# Patient Record
Sex: MALE | Race: ASIAN | NOT HISPANIC OR LATINO | Employment: UNEMPLOYED | URBAN - METROPOLITAN AREA
[De-identification: names, ages, dates, MRNs, and addresses within clinical notes are randomized per-mention and may not be internally consistent; named-entity substitution may affect disease eponyms.]

---

## 2020-05-19 ENCOUNTER — HOSPITAL ENCOUNTER (EMERGENCY)
Facility: HOSPITAL | Age: 50
Discharge: PSYCHIATRIC OR SUBSTANCE USE FACILITY | End: 2020-05-20
Attending: EMERGENCY MEDICINE
Payer: MEDICARE

## 2020-05-19 DIAGNOSIS — R56.9 SEIZURE: ICD-10-CM

## 2020-05-19 DIAGNOSIS — F29 PSYCHOSIS, UNSPECIFIED PSYCHOSIS TYPE: Primary | ICD-10-CM

## 2020-05-19 LAB
ALBUMIN SERPL BCP-MCNC: 4.3 G/DL (ref 3.5–5.2)
ALP SERPL-CCNC: 66 U/L (ref 55–135)
ALT SERPL W/O P-5'-P-CCNC: 14 U/L (ref 10–44)
AMPHET+METHAMPHET UR QL: NEGATIVE
AMPHET+METHAMPHET UR QL: NEGATIVE
ANION GAP SERPL CALC-SCNC: 13 MMOL/L (ref 8–16)
APAP SERPL-MCNC: <3 UG/ML (ref 10–20)
AST SERPL-CCNC: 24 U/L (ref 10–40)
BARBITURATES UR QL SCN>200 NG/ML: NEGATIVE
BARBITURATES UR QL SCN>200 NG/ML: NEGATIVE
BASOPHILS # BLD AUTO: 0.02 K/UL (ref 0–0.2)
BASOPHILS NFR BLD: 0.2 % (ref 0–1.9)
BENZODIAZ UR QL SCN>200 NG/ML: NEGATIVE
BENZODIAZ UR QL SCN>200 NG/ML: NEGATIVE
BILIRUB SERPL-MCNC: 0.5 MG/DL (ref 0.1–1)
BILIRUB UR QL STRIP: NEGATIVE
BUN SERPL-MCNC: 9 MG/DL (ref 6–20)
BZE UR QL SCN: NEGATIVE
BZE UR QL SCN: NEGATIVE
CALCIUM SERPL-MCNC: 10 MG/DL (ref 8.7–10.5)
CANNABINOIDS UR QL SCN: NEGATIVE
CANNABINOIDS UR QL SCN: NEGATIVE
CHLORIDE SERPL-SCNC: 98 MMOL/L (ref 95–110)
CK SERPL-CCNC: 466 U/L (ref 20–200)
CLARITY UR: CLEAR
CO2 SERPL-SCNC: 24 MMOL/L (ref 23–29)
COLOR UR: ABNORMAL
CREAT SERPL-MCNC: 0.8 MG/DL (ref 0.5–1.4)
CREAT UR-MCNC: 13.3 MG/DL (ref 23–375)
CREAT UR-MCNC: 13.5 MG/DL (ref 23–375)
DIFFERENTIAL METHOD: ABNORMAL
EOSINOPHIL # BLD AUTO: 0 K/UL (ref 0–0.5)
EOSINOPHIL NFR BLD: 0.2 % (ref 0–8)
ERYTHROCYTE [DISTWIDTH] IN BLOOD BY AUTOMATED COUNT: 14.5 % (ref 11.5–14.5)
EST. GFR  (AFRICAN AMERICAN): >60 ML/MIN/1.73 M^2
EST. GFR  (NON AFRICAN AMERICAN): >60 ML/MIN/1.73 M^2
ETHANOL SERPL-MCNC: <10 MG/DL
GLUCOSE SERPL-MCNC: 105 MG/DL (ref 70–110)
GLUCOSE UR QL STRIP: NEGATIVE
HCT VFR BLD AUTO: 36.7 % (ref 40–54)
HGB BLD-MCNC: 12.3 G/DL (ref 14–18)
HGB UR QL STRIP: NEGATIVE
IMM GRANULOCYTES # BLD AUTO: 0.04 K/UL (ref 0–0.04)
IMM GRANULOCYTES NFR BLD AUTO: 0.4 % (ref 0–0.5)
KETONES UR QL STRIP: NEGATIVE
LEUKOCYTE ESTERASE UR QL STRIP: NEGATIVE
LYMPHOCYTES # BLD AUTO: 1.7 K/UL (ref 1–4.8)
LYMPHOCYTES NFR BLD: 18.7 % (ref 18–48)
MCH RBC QN AUTO: 30.5 PG (ref 27–31)
MCHC RBC AUTO-ENTMCNC: 33.5 G/DL (ref 32–36)
MCV RBC AUTO: 91 FL (ref 82–98)
METHADONE UR QL SCN>300 NG/ML: NEGATIVE
METHADONE UR QL SCN>300 NG/ML: NEGATIVE
MONOCYTES # BLD AUTO: 1.2 K/UL (ref 0.3–1)
MONOCYTES NFR BLD: 13.9 % (ref 4–15)
NEUTROPHILS # BLD AUTO: 5.9 K/UL (ref 1.8–7.7)
NEUTROPHILS NFR BLD: 66.6 % (ref 38–73)
NITRITE UR QL STRIP: NEGATIVE
NRBC BLD-RTO: 0 /100 WBC
OPIATES UR QL SCN: NEGATIVE
OPIATES UR QL SCN: NEGATIVE
PCP UR QL SCN>25 NG/ML: NEGATIVE
PCP UR QL SCN>25 NG/ML: NEGATIVE
PH UR STRIP: 8 [PH] (ref 5–8)
PLATELET # BLD AUTO: 310 K/UL (ref 150–350)
PMV BLD AUTO: 9.1 FL (ref 9.2–12.9)
POTASSIUM SERPL-SCNC: 3.8 MMOL/L (ref 3.5–5.1)
PROT SERPL-MCNC: 8.1 G/DL (ref 6–8.4)
PROT UR QL STRIP: NEGATIVE
RBC # BLD AUTO: 4.03 M/UL (ref 4.6–6.2)
SARS-COV-2 RDRP RESP QL NAA+PROBE: NEGATIVE
SODIUM SERPL-SCNC: 135 MMOL/L (ref 136–145)
SP GR UR STRIP: 1 (ref 1–1.03)
TOXICOLOGY INFORMATION: ABNORMAL
TOXICOLOGY INFORMATION: ABNORMAL
TROPONIN I SERPL DL<=0.01 NG/ML-MCNC: <0.006 NG/ML (ref 0–0.03)
TSH SERPL DL<=0.005 MIU/L-ACNC: 0.62 UIU/ML (ref 0.4–4)
URN SPEC COLLECT METH UR: ABNORMAL
UROBILINOGEN UR STRIP-ACNC: NEGATIVE EU/DL
WBC # BLD AUTO: 8.91 K/UL (ref 3.9–12.7)

## 2020-05-19 PROCEDURE — 93010 ELECTROCARDIOGRAM REPORT: CPT | Mod: ,,, | Performed by: INTERNAL MEDICINE

## 2020-05-19 PROCEDURE — 81003 URINALYSIS AUTO W/O SCOPE: CPT | Mod: 59

## 2020-05-19 PROCEDURE — 25000003 PHARM REV CODE 250: Performed by: EMERGENCY MEDICINE

## 2020-05-19 PROCEDURE — 80320 DRUG SCREEN QUANTALCOHOLS: CPT | Mod: 59

## 2020-05-19 PROCEDURE — 80307 DRUG TEST PRSMV CHEM ANLYZR: CPT

## 2020-05-19 PROCEDURE — 93010 EKG 12-LEAD: ICD-10-PCS | Mod: ,,, | Performed by: INTERNAL MEDICINE

## 2020-05-19 PROCEDURE — 63600175 PHARM REV CODE 636 W HCPCS: Performed by: EMERGENCY MEDICINE

## 2020-05-19 PROCEDURE — 96366 THER/PROPH/DIAG IV INF ADDON: CPT

## 2020-05-19 PROCEDURE — 84443 ASSAY THYROID STIM HORMONE: CPT

## 2020-05-19 PROCEDURE — 84484 ASSAY OF TROPONIN QUANT: CPT

## 2020-05-19 PROCEDURE — 99285 EMERGENCY DEPT VISIT HI MDM: CPT | Mod: 25

## 2020-05-19 PROCEDURE — 82550 ASSAY OF CK (CPK): CPT

## 2020-05-19 PROCEDURE — 80053 COMPREHEN METABOLIC PANEL: CPT

## 2020-05-19 PROCEDURE — 85025 COMPLETE CBC W/AUTO DIFF WBC: CPT

## 2020-05-19 PROCEDURE — 96365 THER/PROPH/DIAG IV INF INIT: CPT

## 2020-05-19 PROCEDURE — 93005 ELECTROCARDIOGRAM TRACING: CPT

## 2020-05-19 PROCEDURE — U0002 COVID-19 LAB TEST NON-CDC: HCPCS

## 2020-05-19 PROCEDURE — 80329 ANALGESICS NON-OPIOID 1 OR 2: CPT

## 2020-05-19 RX ADMIN — THIAMINE HYDROCHLORIDE 100 MG: 100 INJECTION, SOLUTION INTRAMUSCULAR; INTRAVENOUS at 03:05

## 2020-05-19 RX ADMIN — SODIUM CHLORIDE, SODIUM LACTATE, POTASSIUM CHLORIDE, AND CALCIUM CHLORIDE 1000 ML: .6; .31; .03; .02 INJECTION, SOLUTION INTRAVENOUS at 03:05

## 2020-05-19 NOTE — ED TRIAGE NOTES
The patient presents to the ED via ems. Emt states that patient was found at [employer] by workers with ataxic movements. Patient seems to be awake but post ictal upon arrival. Patient is oriented to self but shakes head to answer questions. Hx of brain injury,stroke. Patient also c/o N/V/D x 1 day.

## 2020-05-19 NOTE — ED PROVIDER NOTES
"Encounter Date: 5/19/2020    SCRIBE #1 NOTE: I, Louie Badillo, am scribing for, and in the presence of,  Aki Morillo MD. I have scribed the following portions of the note - Other sections scribed: HPI, ROS, PE.       History     Chief Complaint   Patient presents with    Seizures     witnessed sz in [employer].  Hx brain injury.  took oxy for back pain this am.  pt not talking for following commands.     CC: Seizures    HPI: This is a 50 y.o. M who Hx of Stroke, Depression, and Brain surgery who presents to the ED for emergent evaluation of possible seizure PTA. Pt was found with "seizure-like activity" by workers in [employer] PTA. Pt currently complains of headache. He also admits to cough for the past few days. When asked if had a seizure today pt states "a little bit." He also states that he has seizures sometimes. He is not on seizure medications. He states that he eats 2 meals per day and drinks beer sometimes. He denies drinking beer today. He has been drinking 3 beers per day for a long time. He last consumed beer 1 week ago. He states that he shakes sometimes when he doesn't drink beer, but he has never had a seizure if doesn't drink beer. He states that he was last seen by his PCP 2-3 months ago.    The history is provided by the patient. The history is limited by a language barrier. A  was used (Language line used for interpretation).     Review of patient's allergies indicates:  No Known Allergies  Past Medical History:   Diagnosis Date    Depression     Hemorrhage in the brain     Seizures     Stroke      Past Surgical History:   Procedure Laterality Date    BRAIN SURGERY      KNEE SURGERY       Family History   Problem Relation Age of Onset    Hypertension Mother     Stroke Mother     Stroke Father      Social History     Tobacco Use    Smoking status: Current Some Day Smoker   Substance Use Topics    Alcohol use: Yes     Comment: rarely    Drug use: No "     Review of Systems   Constitutional: Negative for chills and fever.   HENT: Negative for sore throat.    Eyes: Negative for visual disturbance.   Respiratory: Positive for cough. Negative for shortness of breath.    Cardiovascular: Negative for chest pain.   Gastrointestinal: Negative for abdominal pain, diarrhea, nausea and vomiting.   Genitourinary: Negative for dysuria.   Musculoskeletal: Negative for back pain.   Skin: Negative for rash.   Neurological: Positive for seizures and headaches. Negative for weakness.   Hematological: Does not bruise/bleed easily.       Physical Exam     Initial Vitals [05/19/20 1422]   BP Pulse Resp Temp SpO2   135/78 95 16 99.4 °F (37.4 °C) 98 %      MAP       --         Physical Exam    Nursing note and vitals reviewed.  Constitutional: He appears well-developed and well-nourished. He is not diaphoretic. No distress.   HENT:   Head: Normocephalic and atraumatic.   Right Ear: External ear normal.   Left Ear: External ear normal.   Nose: Nose normal.   Eyes: Conjunctivae and EOM are normal. Pupils are equal, round, and reactive to light.   Neck: Normal range of motion. Neck supple.   Cardiovascular: Normal rate, regular rhythm, normal heart sounds and intact distal pulses.   Pulmonary/Chest: Breath sounds normal. No stridor. No respiratory distress.   Abdominal: Soft. Bowel sounds are normal. There is no tenderness.   Musculoskeletal: He exhibits no edema or tenderness.   Neurological: He is alert.   Skin: Skin is warm and dry. No pallor.         ED Course   Procedures  Labs Reviewed   CBC W/ AUTO DIFFERENTIAL - Abnormal; Notable for the following components:       Result Value    RBC 4.03 (*)     Hemoglobin 12.3 (*)     Hematocrit 36.7 (*)     MPV 9.1 (*)     Mono # 1.2 (*)     All other components within normal limits   COMPREHENSIVE METABOLIC PANEL - Abnormal; Notable for the following components:    Sodium 135 (*)     All other components within normal limits   DRUG SCREEN  PANEL, URINE EMERGENCY - Abnormal; Notable for the following components:    Creatinine, Random Ur 13.5 (*)     All other components within normal limits   CK - Abnormal; Notable for the following components:     (*)     All other components within normal limits   ACETAMINOPHEN LEVEL - Abnormal; Notable for the following components:    Acetaminophen (Tylenol), Serum <3.0 (*)     All other components within normal limits   DRUG SCREEN PANEL, URINE EMERGENCY - Abnormal; Notable for the following components:    Creatinine, Random Ur 13.3 (*)     All other components within normal limits    Narrative:     Preferred Collection Type->Urine, Clean Catch   URINALYSIS, REFLEX TO URINE CULTURE - Abnormal; Notable for the following components:    Specific Gravity, UA 1.000 (*)     All other components within normal limits    Narrative:     Preferred Collection Type->Urine, Clean Catch   ALCOHOL,MEDICAL (ETHANOL)   TROPONIN I   TSH   SARS-COV-2 RNA AMPLIFICATION, QUAL   URINALYSIS, REFLEX TO URINE CULTURE          Imaging Results          X-Ray Chest AP Portable (Final result)  Result time 05/19/20 16:51:33    Final result by Clarke Dos Santos MD (05/19/20 16:51:33)                 Impression:      Minimal bibasilar airspace disease could represent minimal infiltrate or atelectasis.      Electronically signed by: Clarke Dos Santos  Date:    05/19/2020  Time:    16:51             Narrative:    EXAMINATION:  XR CHEST AP PORTABLE    CLINICAL HISTORY:  Unspecified convulsions    TECHNIQUE:  Single frontal view of the chest was performed.    COMPARISON:  None    FINDINGS:  Minimal bibasilar airspace disease could represent minimal infiltrate or atelectasis.    No mass or lobar consolidation.    The heart is normal size.  No edema is detected.  No effusion or pneumothorax.  No acute osseous abnormality.                               CT Head Without Contrast (Final result)  Result time 05/19/20 15:45:54    Final result by Otoniel XIAO  MD Antonette (05/19/20 15:45:54)                 Impression:      No acute intracranial abnormalities identified.  No evidence for intracranial hemorrhage.    Surgical changes related to prior left parietal craniotomy.    Ethmoid sinus mucosal disease.      Electronically signed by: Otoniel Whitman MD  Date:    05/19/2020  Time:    15:45             Narrative:    EXAMINATION:  CT HEAD WITHOUT CONTRAST    CLINICAL HISTORY:  Seizures new or progressive;    TECHNIQUE:  Low dose axial images were obtained through the head.  Coronal and sagittal reformations were also performed. Contrast was not administered.    COMPARISON:  None.    FINDINGS:  The ventricles are normal in size and configuration.  There is normal gray-white matter differentiation maintained.  There is no evidence for abnormal masses, mass effect, or midline shift.  No abnormal intra or extra-axial fluid collections are identified, specifically there is no evidence for intracranial hemorrhage.  There is mucosal thickening within the ethmoid sinuses.  Mastoid air cells are clear.  There are surgical changes related to prior left parietal craniotomy.                                 Medical Decision Making:   Clinical Tests:   Lab Tests: Ordered and Reviewed  Radiological Study: Ordered and Reviewed  Medical Tests: Ordered and Reviewed  ED Management:  50 year old M presents to the ED for emergent evaluation of seizure. The patient was seen and examined. The history and physical exam was obtained. The nursing notes and vital signs were reviewed.             ED workup remarkable for CBC/CMP wnl, CPK - 466, trop wnl, acetaminophen/salicylate neg, ETOH neg, CT head negative, CXR minimal bibasilar airspace disease.  Pt presentation concerning initially for seizures, hwoever upon further evaluation patient reporting tremors with increased drinking, last drink was 1 week ago.  Timeline and presentation inconsistent with seizures or with DTs/ ETOH withdrawal.  Started  "on IVFs/thiamine.  After further discussion with patient and daughter, Jeanine Frances, reporting pateint has been having worsening hallucinations and was running around his apt complex holding a machete and threatening "spirits".  Pt reports the spirits are getting worse and family is worried he is going to harm himself or someone around him and were going to bring him to be "committed" earlier today when he had wandered off and was found at [employer] as noted above.  PEC placed and patient pending labwork and full medical clearance, turnover to Dr. Love pending medical clearance for psychiatric hospitalization.    Patient signed out to me around 6:10 p.m. pending urinalysis and urine drug screen.  Please see above note for in details.  Patient is not showing any tremors at this time is not tachycardic or markedly hypertensive.  Patient is sleeping and resting on multiple exams.  Does not look in acute distress.  Urinalysis was we sent off.  UA came back is negative an UDS is negative.  Patient is medically cleared for inpatient psych.  Elder Love                                 Clinical Impression:       ICD-10-CM ICD-9-CM   1. Psychosis, unspecified psychosis type F29 298.9   2. Seizure R56.9 780.39             ED Disposition Condition    Transfer to Psych Facility         ED Prescriptions     None        Follow-up Information    None                       I, Elder Love M.D., personally performed the services described in this documentation. All medical record entries made by the scribe were at my direction and in my presence. I have reviewed the chart and agree that the record reflects my personal performance and is accurate and complete.             Elder Love MD  05/19/20 2053    "

## 2020-05-20 VITALS
TEMPERATURE: 99 F | HEART RATE: 79 BPM | SYSTOLIC BLOOD PRESSURE: 105 MMHG | RESPIRATION RATE: 17 BRPM | DIASTOLIC BLOOD PRESSURE: 70 MMHG | OXYGEN SATURATION: 100 %

## 2020-05-20 NOTE — ED NOTES
notified [facility] office of [facility]

## 2020-05-20 NOTE — ED NOTES
Pt awake and able to stand on the side of the bed to urinate in urinal; pt then assisted back into bed; pt calm and cooperative; pt denies any complaints at this time and is aware of PEC status; ED safety sitter at bedside; will continue to monitor

## 2020-05-20 NOTE — ED NOTES
Received report from MARQUISE Mixon, pt sleeping, in no acute distress, call light in reach, bed in lowest position and will continue to monitor.

## 2020-06-30 ENCOUNTER — HOSPITAL ENCOUNTER (EMERGENCY)
Facility: HOSPITAL | Age: 50
Discharge: PSYCHIATRIC OR SUBSTANCE USE FACILITY | End: 2020-07-01
Attending: EMERGENCY MEDICINE
Payer: MEDICARE

## 2020-06-30 DIAGNOSIS — R44.3 HALLUCINATIONS: Primary | ICD-10-CM

## 2020-06-30 LAB
ALBUMIN SERPL BCP-MCNC: 4 G/DL (ref 3.5–5.2)
ALP SERPL-CCNC: 53 U/L (ref 55–135)
ALT SERPL W/O P-5'-P-CCNC: 14 U/L (ref 10–44)
AMPHET+METHAMPHET UR QL: NEGATIVE
ANION GAP SERPL CALC-SCNC: 13 MMOL/L (ref 8–16)
APAP SERPL-MCNC: <3 UG/ML (ref 10–20)
AST SERPL-CCNC: 15 U/L (ref 10–40)
BARBITURATES UR QL SCN>200 NG/ML: NEGATIVE
BASOPHILS # BLD AUTO: 0.03 K/UL (ref 0–0.2)
BASOPHILS NFR BLD: 0.4 % (ref 0–1.9)
BENZODIAZ UR QL SCN>200 NG/ML: NEGATIVE
BILIRUB SERPL-MCNC: 0.4 MG/DL (ref 0.1–1)
BILIRUB UR QL STRIP: NEGATIVE
BUN SERPL-MCNC: 4 MG/DL (ref 6–20)
BZE UR QL SCN: NEGATIVE
CALCIUM SERPL-MCNC: 9.2 MG/DL (ref 8.7–10.5)
CANNABINOIDS UR QL SCN: NEGATIVE
CHLORIDE SERPL-SCNC: 107 MMOL/L (ref 95–110)
CLARITY UR: CLEAR
CO2 SERPL-SCNC: 22 MMOL/L (ref 23–29)
COLOR UR: YELLOW
CREAT SERPL-MCNC: 0.7 MG/DL (ref 0.5–1.4)
CREAT UR-MCNC: 19.8 MG/DL (ref 23–375)
DIFFERENTIAL METHOD: ABNORMAL
EOSINOPHIL # BLD AUTO: 0 K/UL (ref 0–0.5)
EOSINOPHIL NFR BLD: 0.6 % (ref 0–8)
ERYTHROCYTE [DISTWIDTH] IN BLOOD BY AUTOMATED COUNT: 15 % (ref 11.5–14.5)
EST. GFR  (AFRICAN AMERICAN): >60 ML/MIN/1.73 M^2
EST. GFR  (NON AFRICAN AMERICAN): >60 ML/MIN/1.73 M^2
ETHANOL SERPL-MCNC: <10 MG/DL
GLUCOSE SERPL-MCNC: 100 MG/DL (ref 70–110)
GLUCOSE UR QL STRIP: NEGATIVE
HCT VFR BLD AUTO: 36.2 % (ref 40–54)
HGB BLD-MCNC: 11.9 G/DL (ref 14–18)
HGB UR QL STRIP: NEGATIVE
IMM GRANULOCYTES # BLD AUTO: 0.02 K/UL (ref 0–0.04)
IMM GRANULOCYTES NFR BLD AUTO: 0.3 % (ref 0–0.5)
KETONES UR QL STRIP: NEGATIVE
LEUKOCYTE ESTERASE UR QL STRIP: NEGATIVE
LYMPHOCYTES # BLD AUTO: 1.3 K/UL (ref 1–4.8)
LYMPHOCYTES NFR BLD: 18 % (ref 18–48)
MCH RBC QN AUTO: 30.3 PG (ref 27–31)
MCHC RBC AUTO-ENTMCNC: 32.9 G/DL (ref 32–36)
MCV RBC AUTO: 92 FL (ref 82–98)
METHADONE UR QL SCN>300 NG/ML: NEGATIVE
MONOCYTES # BLD AUTO: 0.6 K/UL (ref 0.3–1)
MONOCYTES NFR BLD: 8.3 % (ref 4–15)
NEUTROPHILS # BLD AUTO: 5.2 K/UL (ref 1.8–7.7)
NEUTROPHILS NFR BLD: 72.4 % (ref 38–73)
NITRITE UR QL STRIP: NEGATIVE
NRBC BLD-RTO: 0 /100 WBC
OPIATES UR QL SCN: NEGATIVE
PCP UR QL SCN>25 NG/ML: NEGATIVE
PH UR STRIP: 7 [PH] (ref 5–8)
PLATELET # BLD AUTO: 286 K/UL (ref 150–350)
PMV BLD AUTO: 8.5 FL (ref 9.2–12.9)
POTASSIUM SERPL-SCNC: 4 MMOL/L (ref 3.5–5.1)
PROT SERPL-MCNC: 7.1 G/DL (ref 6–8.4)
PROT UR QL STRIP: NEGATIVE
RBC # BLD AUTO: 3.93 M/UL (ref 4.6–6.2)
SARS-COV-2 RDRP RESP QL NAA+PROBE: NEGATIVE
SODIUM SERPL-SCNC: 142 MMOL/L (ref 136–145)
SP GR UR STRIP: <=1.005 (ref 1–1.03)
TOXICOLOGY INFORMATION: ABNORMAL
TSH SERPL DL<=0.005 MIU/L-ACNC: 0.56 UIU/ML (ref 0.4–4)
URN SPEC COLLECT METH UR: ABNORMAL
UROBILINOGEN UR STRIP-ACNC: NEGATIVE EU/DL
WBC # BLD AUTO: 7.15 K/UL (ref 3.9–12.7)

## 2020-06-30 PROCEDURE — 84443 ASSAY THYROID STIM HORMONE: CPT

## 2020-06-30 PROCEDURE — 80320 DRUG SCREEN QUANTALCOHOLS: CPT

## 2020-06-30 PROCEDURE — 80307 DRUG TEST PRSMV CHEM ANLYZR: CPT

## 2020-06-30 PROCEDURE — 81003 URINALYSIS AUTO W/O SCOPE: CPT | Mod: 59

## 2020-06-30 PROCEDURE — 80053 COMPREHEN METABOLIC PANEL: CPT

## 2020-06-30 PROCEDURE — U0002 COVID-19 LAB TEST NON-CDC: HCPCS

## 2020-06-30 PROCEDURE — 85025 COMPLETE CBC W/AUTO DIFF WBC: CPT

## 2020-06-30 PROCEDURE — 80329 ANALGESICS NON-OPIOID 1 OR 2: CPT

## 2020-06-30 PROCEDURE — 99285 EMERGENCY DEPT VISIT HI MDM: CPT

## 2020-06-30 PROCEDURE — 36415 COLL VENOUS BLD VENIPUNCTURE: CPT

## 2020-07-01 VITALS
HEIGHT: 64 IN | SYSTOLIC BLOOD PRESSURE: 152 MMHG | HEART RATE: 93 BPM | WEIGHT: 132 LBS | OXYGEN SATURATION: 100 % | RESPIRATION RATE: 16 BRPM | DIASTOLIC BLOOD PRESSURE: 89 MMHG | BODY MASS INDEX: 22.53 KG/M2 | TEMPERATURE: 99 F
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
## 2021-02-01 ENCOUNTER — HOSPITAL ENCOUNTER (INPATIENT)
Facility: HOSPITAL | Age: 51
LOS: 2 days | Discharge: HOME | DRG: 083 | End: 2021-02-03
Attending: EMERGENCY MEDICINE | Admitting: PSYCHIATRY & NEUROLOGY
Payer: MEDICARE

## 2021-02-01 DIAGNOSIS — I62.00 SUBDURAL HEMORRHAGE: ICD-10-CM

## 2021-02-01 DIAGNOSIS — S06.329A INTRAPARENCHYMAL HEMATOMA OF BRAIN, LEFT, WITH LOSS OF CONSCIOUSNESS, INITIAL ENCOUNTER: ICD-10-CM

## 2021-02-01 DIAGNOSIS — S06.349A: Primary | ICD-10-CM

## 2021-02-01 DIAGNOSIS — R56.9 SEIZURE: ICD-10-CM

## 2021-02-01 DIAGNOSIS — F10.10 ALCOHOL ABUSE: ICD-10-CM

## 2021-02-01 DIAGNOSIS — S06.5XAA SDH (SUBDURAL HEMATOMA): ICD-10-CM

## 2021-02-01 DIAGNOSIS — F32.0 CURRENT MILD EPISODE OF MAJOR DEPRESSIVE DISORDER WITHOUT PRIOR EPISODE: ICD-10-CM

## 2021-02-01 DIAGNOSIS — I25.10 CAD (CORONARY ARTERY DISEASE): ICD-10-CM

## 2021-02-01 DIAGNOSIS — I10 ESSENTIAL HYPERTENSION: ICD-10-CM

## 2021-02-01 DIAGNOSIS — R55 SYNCOPE: ICD-10-CM

## 2021-02-01 PROBLEM — Z72.0 TOBACCO CONSUMPTION: Status: ACTIVE | Noted: 2021-02-01

## 2021-02-01 PROBLEM — I63.429 STROKE DUE TO EMBOLISM OF ANTERIOR CEREBRAL ARTERY: Status: ACTIVE | Noted: 2021-02-01

## 2021-02-01 PROBLEM — F32.A DEPRESSION: Status: ACTIVE | Noted: 2021-02-01

## 2021-02-01 LAB
ABO + RH BLD: NORMAL
ALBUMIN SERPL BCP-MCNC: 3.9 G/DL (ref 3.5–5.2)
ALP SERPL-CCNC: 102 U/L (ref 55–135)
ALT SERPL W/O P-5'-P-CCNC: 38 U/L (ref 10–44)
AMPHET+METHAMPHET UR QL: NEGATIVE
AMPHET+METHAMPHET UR QL: NEGATIVE
ANION GAP SERPL CALC-SCNC: 12 MMOL/L (ref 8–16)
APTT BLDCRRT: 27.8 SEC (ref 21–32)
AST SERPL-CCNC: 68 U/L (ref 10–40)
BARBITURATES UR QL SCN>200 NG/ML: NEGATIVE
BARBITURATES UR QL SCN>200 NG/ML: NEGATIVE
BASOPHILS # BLD AUTO: 0.04 K/UL (ref 0–0.2)
BASOPHILS NFR BLD: 0.6 % (ref 0–1.9)
BENZODIAZ UR QL SCN>200 NG/ML: NEGATIVE
BENZODIAZ UR QL SCN>200 NG/ML: NEGATIVE
BILIRUB SERPL-MCNC: 0.6 MG/DL (ref 0.1–1)
BILIRUB UR QL STRIP: NEGATIVE
BLD GP AB SCN CELLS X3 SERPL QL: NORMAL
BNP SERPL-MCNC: <10 PG/ML (ref 0–99)
BUN SERPL-MCNC: 4 MG/DL (ref 6–20)
BZE UR QL SCN: NEGATIVE
BZE UR QL SCN: NEGATIVE
CALCIUM SERPL-MCNC: 8.8 MG/DL (ref 8.7–10.5)
CANNABINOIDS UR QL SCN: NEGATIVE
CANNABINOIDS UR QL SCN: NEGATIVE
CHLORIDE SERPL-SCNC: 102 MMOL/L (ref 95–110)
CHOLEST SERPL-MCNC: 181 MG/DL (ref 120–199)
CHOLEST/HDLC SERPL: 1.3 {RATIO} (ref 2–5)
CLARITY UR: CLEAR
CO2 SERPL-SCNC: 22 MMOL/L (ref 23–29)
COLOR UR: YELLOW
CREAT SERPL-MCNC: 0.7 MG/DL (ref 0.5–1.4)
CREAT UR-MCNC: 41.4 MG/DL (ref 23–375)
CREAT UR-MCNC: 41.4 MG/DL (ref 23–375)
CTP QC/QA: YES
DIFFERENTIAL METHOD: ABNORMAL
EOSINOPHIL # BLD AUTO: 0 K/UL (ref 0–0.5)
EOSINOPHIL NFR BLD: 0 % (ref 0–8)
ERYTHROCYTE [DISTWIDTH] IN BLOOD BY AUTOMATED COUNT: 18.1 % (ref 11.5–14.5)
EST. GFR  (AFRICAN AMERICAN): >60 ML/MIN/1.73 M^2
EST. GFR  (NON AFRICAN AMERICAN): >60 ML/MIN/1.73 M^2
ESTIMATED AVG GLUCOSE: 111 MG/DL (ref 68–131)
ETHANOL SERPL-MCNC: <10 MG/DL
GLUCOSE SERPL-MCNC: 152 MG/DL (ref 70–110)
GLUCOSE UR QL STRIP: NEGATIVE
HBA1C MFR BLD: 5.5 % (ref 4–5.6)
HCT VFR BLD AUTO: 37.4 % (ref 40–54)
HDLC SERPL-MCNC: 138 MG/DL (ref 40–75)
HDLC SERPL: 76.2 % (ref 20–50)
HGB BLD-MCNC: 13 G/DL (ref 14–18)
HGB UR QL STRIP: NEGATIVE
IMM GRANULOCYTES # BLD AUTO: 0.02 K/UL (ref 0–0.04)
IMM GRANULOCYTES NFR BLD AUTO: 0.3 % (ref 0–0.5)
INR PPP: 0.9 (ref 0.8–1.2)
KETONES UR QL STRIP: ABNORMAL
LACTATE SERPL-SCNC: 2.6 MMOL/L (ref 0.5–2.2)
LDLC SERPL CALC-MCNC: 34 MG/DL (ref 63–159)
LEUKOCYTE ESTERASE UR QL STRIP: NEGATIVE
LYMPHOCYTES # BLD AUTO: 0.9 K/UL (ref 1–4.8)
LYMPHOCYTES NFR BLD: 12.4 % (ref 18–48)
MAGNESIUM SERPL-MCNC: 1.7 MG/DL (ref 1.6–2.6)
MCH RBC QN AUTO: 29.7 PG (ref 27–31)
MCHC RBC AUTO-ENTMCNC: 34.8 G/DL (ref 32–36)
MCV RBC AUTO: 85 FL (ref 82–98)
METHADONE UR QL SCN>300 NG/ML: NEGATIVE
METHADONE UR QL SCN>300 NG/ML: NEGATIVE
MONOCYTES # BLD AUTO: 0.6 K/UL (ref 0.3–1)
MONOCYTES NFR BLD: 7.9 % (ref 4–15)
NEUTROPHILS # BLD AUTO: 5.5 K/UL (ref 1.8–7.7)
NEUTROPHILS NFR BLD: 78.8 % (ref 38–73)
NITRITE UR QL STRIP: NEGATIVE
NONHDLC SERPL-MCNC: 43 MG/DL
NRBC BLD-RTO: 0 /100 WBC
OPIATES UR QL SCN: NEGATIVE
OPIATES UR QL SCN: NEGATIVE
PCP UR QL SCN>25 NG/ML: NEGATIVE
PCP UR QL SCN>25 NG/ML: NEGATIVE
PH UR STRIP: 7 [PH] (ref 5–8)
PLATELET # BLD AUTO: 119 K/UL (ref 150–350)
PMV BLD AUTO: 9.7 FL (ref 9.2–12.9)
POCT GLUCOSE: 114 MG/DL (ref 70–110)
POCT GLUCOSE: 153 MG/DL (ref 70–110)
POTASSIUM SERPL-SCNC: 4.1 MMOL/L (ref 3.5–5.1)
PROT SERPL-MCNC: 7.6 G/DL (ref 6–8.4)
PROT UR QL STRIP: NEGATIVE
PROTHROMBIN TIME: 9.5 SEC (ref 9–12.5)
RBC # BLD AUTO: 4.38 M/UL (ref 4.6–6.2)
SARS-COV-2 RDRP RESP QL NAA+PROBE: NEGATIVE
SODIUM SERPL-SCNC: 136 MMOL/L (ref 136–145)
SP GR UR STRIP: 1.01 (ref 1–1.03)
TOXICOLOGY INFORMATION: NORMAL
TOXICOLOGY INFORMATION: NORMAL
TRIGL SERPL-MCNC: 45 MG/DL (ref 30–150)
TROPONIN I SERPL DL<=0.01 NG/ML-MCNC: 0.01 NG/ML (ref 0–0.03)
TSH SERPL DL<=0.005 MIU/L-ACNC: 0.48 UIU/ML (ref 0.4–4)
URN SPEC COLLECT METH UR: ABNORMAL
UROBILINOGEN UR STRIP-ACNC: NEGATIVE EU/DL
WBC # BLD AUTO: 6.96 K/UL (ref 3.9–12.7)

## 2021-02-01 PROCEDURE — 85730 THROMBOPLASTIN TIME PARTIAL: CPT

## 2021-02-01 PROCEDURE — U0002 COVID-19 LAB TEST NON-CDC: HCPCS | Performed by: EMERGENCY MEDICINE

## 2021-02-01 PROCEDURE — 96375 TX/PRO/DX INJ NEW DRUG ADDON: CPT

## 2021-02-01 PROCEDURE — 93010 EKG 12-LEAD: ICD-10-PCS | Mod: 76,,, | Performed by: INTERNAL MEDICINE

## 2021-02-01 PROCEDURE — 25000003 PHARM REV CODE 250: Performed by: EMERGENCY MEDICINE

## 2021-02-01 PROCEDURE — 99223 PR INITIAL HOSPITAL CARE,LEVL III: ICD-10-PCS | Mod: ,,, | Performed by: NURSE PRACTITIONER

## 2021-02-01 PROCEDURE — 84443 ASSAY THYROID STIM HORMONE: CPT

## 2021-02-01 PROCEDURE — 99291 CRITICAL CARE FIRST HOUR: CPT | Mod: ,,, | Performed by: NURSE PRACTITIONER

## 2021-02-01 PROCEDURE — 95718 EEG PHYS/QHP 2-12 HR W/VEEG: CPT | Mod: ,,, | Performed by: PSYCHIATRY & NEUROLOGY

## 2021-02-01 PROCEDURE — 86803 HEPATITIS C AB TEST: CPT

## 2021-02-01 PROCEDURE — 20000000 HC ICU ROOM

## 2021-02-01 PROCEDURE — 83605 ASSAY OF LACTIC ACID: CPT

## 2021-02-01 PROCEDURE — 63600175 PHARM REV CODE 636 W HCPCS: Performed by: EMERGENCY MEDICINE

## 2021-02-01 PROCEDURE — 93010 ELECTROCARDIOGRAM REPORT: CPT | Mod: ,,, | Performed by: INTERNAL MEDICINE

## 2021-02-01 PROCEDURE — 25000003 PHARM REV CODE 250: Performed by: PHYSICIAN ASSISTANT

## 2021-02-01 PROCEDURE — 25000003 PHARM REV CODE 250: Performed by: PSYCHIATRY & NEUROLOGY

## 2021-02-01 PROCEDURE — 80307 DRUG TEST PRSMV CHEM ANLYZR: CPT

## 2021-02-01 PROCEDURE — 99291 PR CRITICAL CARE, E/M 30-74 MINUTES: ICD-10-PCS | Mod: ,,, | Performed by: NURSE PRACTITIONER

## 2021-02-01 PROCEDURE — 93010 EKG 12-LEAD: ICD-10-PCS | Mod: ,,, | Performed by: INTERNAL MEDICINE

## 2021-02-01 PROCEDURE — 93005 ELECTROCARDIOGRAM TRACING: CPT

## 2021-02-01 PROCEDURE — 82962 GLUCOSE BLOOD TEST: CPT

## 2021-02-01 PROCEDURE — 85025 COMPLETE CBC W/AUTO DIFF WBC: CPT

## 2021-02-01 PROCEDURE — 86703 HIV-1/HIV-2 1 RESULT ANTBDY: CPT

## 2021-02-01 PROCEDURE — 83735 ASSAY OF MAGNESIUM: CPT

## 2021-02-01 PROCEDURE — 96365 THER/PROPH/DIAG IV INF INIT: CPT

## 2021-02-01 PROCEDURE — 80053 COMPREHEN METABOLIC PANEL: CPT

## 2021-02-01 PROCEDURE — 83880 ASSAY OF NATRIURETIC PEPTIDE: CPT

## 2021-02-01 PROCEDURE — 63600175 PHARM REV CODE 636 W HCPCS: Performed by: PHYSICIAN ASSISTANT

## 2021-02-01 PROCEDURE — 86900 BLOOD TYPING SEROLOGIC ABO: CPT

## 2021-02-01 PROCEDURE — 83036 HEMOGLOBIN GLYCOSYLATED A1C: CPT

## 2021-02-01 PROCEDURE — 25000003 PHARM REV CODE 250: Performed by: NURSE PRACTITIONER

## 2021-02-01 PROCEDURE — 99291 CRITICAL CARE FIRST HOUR: CPT | Mod: 25

## 2021-02-01 PROCEDURE — 82077 ASSAY SPEC XCP UR&BREATH IA: CPT

## 2021-02-01 PROCEDURE — 96361 HYDRATE IV INFUSION ADD-ON: CPT

## 2021-02-01 PROCEDURE — 84484 ASSAY OF TROPONIN QUANT: CPT

## 2021-02-01 PROCEDURE — 81003 URINALYSIS AUTO W/O SCOPE: CPT | Mod: 59

## 2021-02-01 PROCEDURE — 27100245 HC EEG CAPS, DISPOSABLE

## 2021-02-01 PROCEDURE — 80061 LIPID PANEL: CPT

## 2021-02-01 PROCEDURE — 93010 ELECTROCARDIOGRAM REPORT: CPT | Mod: 76,,, | Performed by: INTERNAL MEDICINE

## 2021-02-01 PROCEDURE — 95718 PR EEG, W/VIDEO, CONT RECORD, I&R, 2-12 HRS: ICD-10-PCS | Mod: ,,, | Performed by: PSYCHIATRY & NEUROLOGY

## 2021-02-01 PROCEDURE — 85610 PROTHROMBIN TIME: CPT

## 2021-02-01 PROCEDURE — 99223 1ST HOSP IP/OBS HIGH 75: CPT | Mod: ,,, | Performed by: NURSE PRACTITIONER

## 2021-02-01 RX ORDER — AMOXICILLIN 250 MG
1 CAPSULE ORAL 2 TIMES DAILY
Status: DISCONTINUED | OUTPATIENT
Start: 2021-02-01 | End: 2021-02-03 | Stop reason: HOSPADM

## 2021-02-01 RX ORDER — SODIUM CHLORIDE 0.9 % (FLUSH) 0.9 %
10 SYRINGE (ML) INJECTION
Status: DISCONTINUED | OUTPATIENT
Start: 2021-02-01 | End: 2021-02-03 | Stop reason: HOSPADM

## 2021-02-01 RX ORDER — LABETALOL HYDROCHLORIDE 5 MG/ML
10 INJECTION, SOLUTION INTRAVENOUS
Status: COMPLETED | OUTPATIENT
Start: 2021-02-01 | End: 2021-02-01

## 2021-02-01 RX ORDER — ACETAMINOPHEN 325 MG/1
650 TABLET ORAL EVERY 6 HOURS PRN
Status: DISCONTINUED | OUTPATIENT
Start: 2021-02-01 | End: 2021-02-01

## 2021-02-01 RX ORDER — NICARDIPINE HYDROCHLORIDE 0.2 MG/ML
0-15 INJECTION INTRAVENOUS CONTINUOUS
Status: DISCONTINUED | OUTPATIENT
Start: 2021-02-01 | End: 2021-02-02

## 2021-02-01 RX ORDER — ONDANSETRON 2 MG/ML
4 INJECTION INTRAMUSCULAR; INTRAVENOUS EVERY 8 HOURS PRN
Status: DISCONTINUED | OUTPATIENT
Start: 2021-02-01 | End: 2021-02-03 | Stop reason: HOSPADM

## 2021-02-01 RX ORDER — LABETALOL HCL 20 MG/4 ML
10 SYRINGE (ML) INTRAVENOUS EVERY 4 HOURS PRN
Status: DISCONTINUED | OUTPATIENT
Start: 2021-02-01 | End: 2021-02-02

## 2021-02-01 RX ORDER — ESCITALOPRAM OXALATE 10 MG/1
10 TABLET ORAL DAILY
Status: DISCONTINUED | OUTPATIENT
Start: 2021-02-02 | End: 2021-02-03 | Stop reason: HOSPADM

## 2021-02-01 RX ORDER — THIAMINE HCL 100 MG
100 TABLET ORAL DAILY
Status: DISCONTINUED | OUTPATIENT
Start: 2021-02-02 | End: 2021-02-03 | Stop reason: HOSPADM

## 2021-02-01 RX ORDER — ACETAMINOPHEN 325 MG/1
650 TABLET ORAL EVERY 6 HOURS PRN
Status: DISCONTINUED | OUTPATIENT
Start: 2021-02-01 | End: 2021-02-03 | Stop reason: HOSPADM

## 2021-02-01 RX ORDER — SODIUM CHLORIDE 9 MG/ML
INJECTION, SOLUTION INTRAVENOUS CONTINUOUS
Status: DISCONTINUED | OUTPATIENT
Start: 2021-02-01 | End: 2021-02-02

## 2021-02-01 RX ORDER — GLUCAGON 1 MG
1 KIT INJECTION
Status: DISCONTINUED | OUTPATIENT
Start: 2021-02-01 | End: 2021-02-02

## 2021-02-01 RX ORDER — LORAZEPAM 2 MG/ML
1 INJECTION INTRAMUSCULAR
Status: ACTIVE | OUTPATIENT
Start: 2021-02-01 | End: 2021-02-02

## 2021-02-01 RX ORDER — LEVETIRACETAM 5 MG/ML
500 INJECTION INTRAVASCULAR EVERY 12 HOURS
Status: DISCONTINUED | OUTPATIENT
Start: 2021-02-01 | End: 2021-02-03

## 2021-02-01 RX ORDER — FOLIC ACID 1 MG/1
1 TABLET ORAL DAILY
Status: DISCONTINUED | OUTPATIENT
Start: 2021-02-02 | End: 2021-02-03 | Stop reason: HOSPADM

## 2021-02-01 RX ORDER — MUPIROCIN 20 MG/G
OINTMENT TOPICAL 2 TIMES DAILY
Status: DISCONTINUED | OUTPATIENT
Start: 2021-02-01 | End: 2021-02-03 | Stop reason: HOSPADM

## 2021-02-01 RX ORDER — INSULIN ASPART 100 [IU]/ML
1-10 INJECTION, SOLUTION INTRAVENOUS; SUBCUTANEOUS EVERY 6 HOURS PRN
Status: DISCONTINUED | OUTPATIENT
Start: 2021-02-01 | End: 2021-02-02

## 2021-02-01 RX ADMIN — SODIUM CHLORIDE, SODIUM LACTATE, POTASSIUM CHLORIDE, AND CALCIUM CHLORIDE 1000 ML: .6; .31; .03; .02 INJECTION, SOLUTION INTRAVENOUS at 02:02

## 2021-02-01 RX ADMIN — MUPIROCIN: 20 OINTMENT TOPICAL at 10:02

## 2021-02-01 RX ADMIN — LABETALOL HYDROCHLORIDE 10 MG: 5 INJECTION INTRAVENOUS at 03:02

## 2021-02-01 RX ADMIN — FOLIC ACID: 5 INJECTION, SOLUTION INTRAMUSCULAR; INTRAVENOUS; SUBCUTANEOUS at 04:02

## 2021-02-01 RX ADMIN — LEVETIRACETAM 500 MG: 5 INJECTION INTRAVENOUS at 10:02

## 2021-02-01 RX ADMIN — DOCUSATE SODIUM AND SENNOSIDES 1 TABLET: 8.6; 5 TABLET, FILM COATED ORAL at 10:02

## 2021-02-01 RX ADMIN — ACETAMINOPHEN 650 MG: 325 TABLET ORAL at 07:02

## 2021-02-01 RX ADMIN — NICARDIPINE HYDROCHLORIDE 2.5 MG/HR: 0.2 INJECTION, SOLUTION INTRAVENOUS at 07:02

## 2021-02-01 RX ADMIN — SODIUM CHLORIDE: 0.9 INJECTION, SOLUTION INTRAVENOUS at 07:02

## 2021-02-02 PROBLEM — E44.0 MODERATE MALNUTRITION: Status: ACTIVE | Noted: 2021-02-02

## 2021-02-02 LAB
ALBUMIN SERPL BCP-MCNC: 3.5 G/DL (ref 3.5–5.2)
ALP SERPL-CCNC: 100 U/L (ref 55–135)
ALT SERPL W/O P-5'-P-CCNC: 33 U/L (ref 10–44)
ANION GAP SERPL CALC-SCNC: 11 MMOL/L (ref 8–16)
AST SERPL-CCNC: 51 U/L (ref 10–40)
AV INDEX (PROSTH): 0.89
AV MEAN GRADIENT: 2 MMHG
AV PEAK GRADIENT: 3 MMHG
AV VALVE AREA: 3 CM2
AV VELOCITY RATIO: 0.86
BASOPHILS # BLD AUTO: 0.02 K/UL (ref 0–0.2)
BASOPHILS NFR BLD: 0.3 % (ref 0–1.9)
BILIRUB SERPL-MCNC: 1.3 MG/DL (ref 0.1–1)
BSA FOR ECHO PROCEDURE: 1.62 M2
BUN SERPL-MCNC: 3 MG/DL (ref 6–20)
CALCIUM SERPL-MCNC: 8.6 MG/DL (ref 8.7–10.5)
CHLORIDE SERPL-SCNC: 103 MMOL/L (ref 95–110)
CO2 SERPL-SCNC: 22 MMOL/L (ref 23–29)
CREAT SERPL-MCNC: 0.6 MG/DL (ref 0.5–1.4)
CV ECHO LV RWT: 0.34 CM
DIFFERENTIAL METHOD: ABNORMAL
DOP CALC AO PEAK VEL: 0.87 M/S
DOP CALC AO VTI: 15.47 CM
DOP CALC LVOT AREA: 3.4 CM2
DOP CALC LVOT DIAMETER: 2.07 CM
DOP CALC LVOT PEAK VEL: 0.75 M/S
DOP CALC LVOT STROKE VOLUME: 46.42 CM3
DOP CALCLVOT PEAK VEL VTI: 13.8 CM
ECHO LV POSTERIOR WALL: 0.76 CM (ref 0.6–1.1)
EOSINOPHIL # BLD AUTO: 0.1 K/UL (ref 0–0.5)
EOSINOPHIL NFR BLD: 0.8 % (ref 0–8)
ERYTHROCYTE [DISTWIDTH] IN BLOOD BY AUTOMATED COUNT: 18.3 % (ref 11.5–14.5)
EST. GFR  (AFRICAN AMERICAN): >60 ML/MIN/1.73 M^2
EST. GFR  (NON AFRICAN AMERICAN): >60 ML/MIN/1.73 M^2
FRACTIONAL SHORTENING: 34 % (ref 28–44)
GLUCOSE SERPL-MCNC: 95 MG/DL (ref 70–110)
HCT VFR BLD AUTO: 38.7 % (ref 40–54)
HCV AB SERPL QL IA: NEGATIVE
HGB BLD-MCNC: 12.9 G/DL (ref 14–18)
HIV 1+2 AB+HIV1 P24 AG SERPL QL IA: NEGATIVE
IMM GRANULOCYTES # BLD AUTO: 0.03 K/UL (ref 0–0.04)
IMM GRANULOCYTES NFR BLD AUTO: 0.5 % (ref 0–0.5)
INTERVENTRICULAR SEPTUM: 0.85 CM (ref 0.6–1.1)
LA MAJOR: 3.82 CM
LA MINOR: 3.2 CM
LA WIDTH: 4.11 CM
LEFT ATRIUM SIZE: 2.38 CM
LEFT ATRIUM VOLUME INDEX: 18 ML/M2
LEFT ATRIUM VOLUME: 28.96 CM3
LEFT INTERNAL DIMENSION IN SYSTOLE: 2.99 CM (ref 2.1–4)
LEFT VENTRICLE DIASTOLIC VOLUME INDEX: 39 ML/M2
LEFT VENTRICLE DIASTOLIC VOLUME: 62.79 ML
LEFT VENTRICLE MASS INDEX: 71 G/M2
LEFT VENTRICLE SYSTOLIC VOLUME INDEX: 21.6 ML/M2
LEFT VENTRICLE SYSTOLIC VOLUME: 34.82 ML
LEFT VENTRICULAR INTERNAL DIMENSION IN DIASTOLE: 4.5 CM (ref 3.5–6)
LEFT VENTRICULAR MASS: 114.56 G
LYMPHOCYTES # BLD AUTO: 1 K/UL (ref 1–4.8)
LYMPHOCYTES NFR BLD: 15.5 % (ref 18–48)
MAGNESIUM SERPL-MCNC: 2.3 MG/DL (ref 1.6–2.6)
MCH RBC QN AUTO: 30.1 PG (ref 27–31)
MCHC RBC AUTO-ENTMCNC: 33.3 G/DL (ref 32–36)
MCV RBC AUTO: 90 FL (ref 82–98)
MONOCYTES # BLD AUTO: 0.7 K/UL (ref 0.3–1)
MONOCYTES NFR BLD: 11.4 % (ref 4–15)
NEUTROPHILS # BLD AUTO: 4.6 K/UL (ref 1.8–7.7)
NEUTROPHILS NFR BLD: 71.5 % (ref 38–73)
NRBC BLD-RTO: 0 /100 WBC
PHOSPHATE SERPL-MCNC: 3.2 MG/DL (ref 2.7–4.5)
PLATELET # BLD AUTO: 104 K/UL (ref 150–350)
PMV BLD AUTO: 10.5 FL (ref 9.2–12.9)
POCT GLUCOSE: 103 MG/DL (ref 70–110)
POCT GLUCOSE: 98 MG/DL (ref 70–110)
POTASSIUM SERPL-SCNC: 3.6 MMOL/L (ref 3.5–5.1)
PROT SERPL-MCNC: 7.3 G/DL (ref 6–8.4)
RA PRESSURE: 3 MMHG
RA WIDTH: 3.66 CM
RBC # BLD AUTO: 4.28 M/UL (ref 4.6–6.2)
RIGHT VENTRICULAR END-DIASTOLIC DIMENSION: 2.96 CM
SINUS: 2.94 CM
SODIUM SERPL-SCNC: 136 MMOL/L (ref 136–145)
STJ: 3.46 CM
TDI LATERAL: 0.09 M/S
TDI SEPTAL: 0.08 M/S
TDI: 0.09 M/S
TRICUSPID ANNULAR PLANE SYSTOLIC EXCURSION: 2.17 CM
WBC # BLD AUTO: 6.38 K/UL (ref 3.9–12.7)

## 2021-02-02 PROCEDURE — 97530 THERAPEUTIC ACTIVITIES: CPT

## 2021-02-02 PROCEDURE — 25000003 PHARM REV CODE 250: Performed by: PHYSICIAN ASSISTANT

## 2021-02-02 PROCEDURE — 97802 MEDICAL NUTRITION INDIV IN: CPT

## 2021-02-02 PROCEDURE — 97165 OT EVAL LOW COMPLEX 30 MIN: CPT

## 2021-02-02 PROCEDURE — 99233 PR SUBSEQUENT HOSPITAL CARE,LEVL III: ICD-10-PCS | Mod: GC,,, | Performed by: PSYCHIATRY & NEUROLOGY

## 2021-02-02 PROCEDURE — 63600175 PHARM REV CODE 636 W HCPCS: Performed by: PHYSICIAN ASSISTANT

## 2021-02-02 PROCEDURE — 92610 EVALUATE SWALLOWING FUNCTION: CPT

## 2021-02-02 PROCEDURE — 92523 SPEECH SOUND LANG COMPREHEN: CPT

## 2021-02-02 PROCEDURE — 20000000 HC ICU ROOM

## 2021-02-02 PROCEDURE — 83735 ASSAY OF MAGNESIUM: CPT

## 2021-02-02 PROCEDURE — 84100 ASSAY OF PHOSPHORUS: CPT

## 2021-02-02 PROCEDURE — 25000003 PHARM REV CODE 250: Performed by: PSYCHIATRY & NEUROLOGY

## 2021-02-02 PROCEDURE — 97161 PT EVAL LOW COMPLEX 20 MIN: CPT

## 2021-02-02 PROCEDURE — 94761 N-INVAS EAR/PLS OXIMETRY MLT: CPT

## 2021-02-02 PROCEDURE — 99233 SBSQ HOSP IP/OBS HIGH 50: CPT | Mod: GC,,, | Performed by: PSYCHIATRY & NEUROLOGY

## 2021-02-02 PROCEDURE — 85025 COMPLETE CBC W/AUTO DIFF WBC: CPT

## 2021-02-02 PROCEDURE — 80053 COMPREHEN METABOLIC PANEL: CPT

## 2021-02-02 PROCEDURE — 25000003 PHARM REV CODE 250: Performed by: NURSE PRACTITIONER

## 2021-02-02 PROCEDURE — 99233 SBSQ HOSP IP/OBS HIGH 50: CPT | Mod: ,,, | Performed by: PSYCHIATRY & NEUROLOGY

## 2021-02-02 PROCEDURE — 99233 PR SUBSEQUENT HOSPITAL CARE,LEVL III: ICD-10-PCS | Mod: ,,, | Performed by: PSYCHIATRY & NEUROLOGY

## 2021-02-02 RX ORDER — MEMANTINE HYDROCHLORIDE 5 MG/1
5 TABLET ORAL 2 TIMES DAILY
Status: DISCONTINUED | OUTPATIENT
Start: 2021-02-02 | End: 2021-02-03 | Stop reason: HOSPADM

## 2021-02-02 RX ORDER — LABETALOL HCL 20 MG/4 ML
10 SYRINGE (ML) INTRAVENOUS EVERY 4 HOURS PRN
Status: DISCONTINUED | OUTPATIENT
Start: 2021-02-02 | End: 2021-02-03 | Stop reason: HOSPADM

## 2021-02-02 RX ORDER — NICOTINE 7MG/24HR
1 PATCH, TRANSDERMAL 24 HOURS TRANSDERMAL DAILY
Status: DISCONTINUED | OUTPATIENT
Start: 2021-02-02 | End: 2021-02-03 | Stop reason: HOSPADM

## 2021-02-02 RX ORDER — NICARDIPINE HYDROCHLORIDE 0.2 MG/ML
0-15 INJECTION INTRAVENOUS CONTINUOUS
Status: DISCONTINUED | OUTPATIENT
Start: 2021-02-02 | End: 2021-02-03

## 2021-02-02 RX ADMIN — DOCUSATE SODIUM AND SENNOSIDES 1 TABLET: 8.6; 5 TABLET, FILM COATED ORAL at 10:02

## 2021-02-02 RX ADMIN — Medication 10 MG: at 01:02

## 2021-02-02 RX ADMIN — MUPIROCIN: 20 OINTMENT TOPICAL at 08:02

## 2021-02-02 RX ADMIN — FOLIC ACID 1 MG: 1 TABLET ORAL at 08:02

## 2021-02-02 RX ADMIN — ESCITALOPRAM OXALATE 10 MG: 10 TABLET ORAL at 08:02

## 2021-02-02 RX ADMIN — MEMANTINE HYDROCHLORIDE 5 MG: 5 TABLET ORAL at 09:02

## 2021-02-02 RX ADMIN — MUPIROCIN: 20 OINTMENT TOPICAL at 10:02

## 2021-02-02 RX ADMIN — LEVETIRACETAM 500 MG: 5 INJECTION INTRAVENOUS at 08:02

## 2021-02-02 RX ADMIN — DOCUSATE SODIUM AND SENNOSIDES 1 TABLET: 8.6; 5 TABLET, FILM COATED ORAL at 08:02

## 2021-02-02 RX ADMIN — LEVETIRACETAM 500 MG: 5 INJECTION INTRAVENOUS at 10:02

## 2021-02-02 RX ADMIN — Medication 100 MG: at 08:02

## 2021-02-03 VITALS
HEART RATE: 76 BPM | RESPIRATION RATE: 51 BRPM | BODY MASS INDEX: 23.04 KG/M2 | DIASTOLIC BLOOD PRESSURE: 73 MMHG | SYSTOLIC BLOOD PRESSURE: 130 MMHG | TEMPERATURE: 98 F | OXYGEN SATURATION: 100 % | WEIGHT: 130 LBS | HEIGHT: 63 IN

## 2021-02-03 DIAGNOSIS — I62.00 NONTRAUMATIC SUBDURAL HEMORRHAGE, UNSPECIFIED: ICD-10-CM

## 2021-02-03 LAB
ALBUMIN SERPL BCP-MCNC: 2.8 G/DL (ref 3.5–5.2)
ALP SERPL-CCNC: 73 U/L (ref 55–135)
ALT SERPL W/O P-5'-P-CCNC: 24 U/L (ref 10–44)
ANION GAP SERPL CALC-SCNC: 11 MMOL/L (ref 8–16)
AST SERPL-CCNC: 34 U/L (ref 10–40)
BASOPHILS # BLD AUTO: 0.02 K/UL (ref 0–0.2)
BASOPHILS NFR BLD: 0.4 % (ref 0–1.9)
BILIRUB SERPL-MCNC: 0.6 MG/DL (ref 0.1–1)
BUN SERPL-MCNC: 3 MG/DL (ref 6–20)
CA-I BLDV-SCNC: 1.07 MMOL/L (ref 1.06–1.42)
CALCIUM SERPL-MCNC: 6.8 MG/DL (ref 8.7–10.5)
CHLORIDE SERPL-SCNC: 111 MMOL/L (ref 95–110)
CO2 SERPL-SCNC: 17 MMOL/L (ref 23–29)
CREAT SERPL-MCNC: 0.5 MG/DL (ref 0.5–1.4)
DIFFERENTIAL METHOD: ABNORMAL
EOSINOPHIL # BLD AUTO: 0.1 K/UL (ref 0–0.5)
EOSINOPHIL NFR BLD: 1.5 % (ref 0–8)
ERYTHROCYTE [DISTWIDTH] IN BLOOD BY AUTOMATED COUNT: 17.7 % (ref 11.5–14.5)
EST. GFR  (AFRICAN AMERICAN): >60 ML/MIN/1.73 M^2
EST. GFR  (NON AFRICAN AMERICAN): >60 ML/MIN/1.73 M^2
GLUCOSE SERPL-MCNC: 102 MG/DL (ref 70–110)
HCT VFR BLD AUTO: 32.7 % (ref 40–54)
HGB BLD-MCNC: 10.5 G/DL (ref 14–18)
IMM GRANULOCYTES # BLD AUTO: 0.01 K/UL (ref 0–0.04)
IMM GRANULOCYTES NFR BLD AUTO: 0.2 % (ref 0–0.5)
LYMPHOCYTES # BLD AUTO: 1.1 K/UL (ref 1–4.8)
LYMPHOCYTES NFR BLD: 22 % (ref 18–48)
MAGNESIUM SERPL-MCNC: 1.6 MG/DL (ref 1.6–2.6)
MCH RBC QN AUTO: 29.5 PG (ref 27–31)
MCHC RBC AUTO-ENTMCNC: 32.1 G/DL (ref 32–36)
MCV RBC AUTO: 92 FL (ref 82–98)
MONOCYTES # BLD AUTO: 0.7 K/UL (ref 0.3–1)
MONOCYTES NFR BLD: 14.8 % (ref 4–15)
NEUTROPHILS # BLD AUTO: 2.9 K/UL (ref 1.8–7.7)
NEUTROPHILS NFR BLD: 61.1 % (ref 38–73)
NRBC BLD-RTO: 0 /100 WBC
PHOSPHATE SERPL-MCNC: 2.8 MG/DL (ref 2.7–4.5)
PLATELET # BLD AUTO: 93 K/UL (ref 150–350)
PMV BLD AUTO: 10.4 FL (ref 9.2–12.9)
POTASSIUM SERPL-SCNC: 3 MMOL/L (ref 3.5–5.1)
PROT SERPL-MCNC: 5.7 G/DL (ref 6–8.4)
RBC # BLD AUTO: 3.56 M/UL (ref 4.6–6.2)
SODIUM SERPL-SCNC: 139 MMOL/L (ref 136–145)
WBC # BLD AUTO: 4.81 K/UL (ref 3.9–12.7)

## 2021-02-03 PROCEDURE — 99233 PR SUBSEQUENT HOSPITAL CARE,LEVL III: ICD-10-PCS | Mod: ,,, | Performed by: PHYSICIAN ASSISTANT

## 2021-02-03 PROCEDURE — 63600175 PHARM REV CODE 636 W HCPCS: Performed by: PHYSICIAN ASSISTANT

## 2021-02-03 PROCEDURE — 99233 SBSQ HOSP IP/OBS HIGH 50: CPT | Mod: ,,, | Performed by: PHYSICIAN ASSISTANT

## 2021-02-03 PROCEDURE — 80053 COMPREHEN METABOLIC PANEL: CPT

## 2021-02-03 PROCEDURE — 25000003 PHARM REV CODE 250: Performed by: PSYCHIATRY & NEUROLOGY

## 2021-02-03 PROCEDURE — 94761 N-INVAS EAR/PLS OXIMETRY MLT: CPT

## 2021-02-03 PROCEDURE — 82330 ASSAY OF CALCIUM: CPT

## 2021-02-03 PROCEDURE — 83735 ASSAY OF MAGNESIUM: CPT

## 2021-02-03 PROCEDURE — 97535 SELF CARE MNGMENT TRAINING: CPT

## 2021-02-03 PROCEDURE — 25000003 PHARM REV CODE 250: Performed by: PHYSICIAN ASSISTANT

## 2021-02-03 PROCEDURE — 25000003 PHARM REV CODE 250: Performed by: NURSE PRACTITIONER

## 2021-02-03 PROCEDURE — 85025 COMPLETE CBC W/AUTO DIFF WBC: CPT

## 2021-02-03 PROCEDURE — 84100 ASSAY OF PHOSPHORUS: CPT

## 2021-02-03 RX ORDER — LEVETIRACETAM 500 MG/1
500 TABLET ORAL 2 TIMES DAILY
Status: DISCONTINUED | OUTPATIENT
Start: 2021-02-03 | End: 2021-02-03 | Stop reason: HOSPADM

## 2021-02-03 RX ORDER — LANOLIN ALCOHOL/MO/W.PET/CERES
800 CREAM (GRAM) TOPICAL
Status: DISCONTINUED | OUTPATIENT
Start: 2021-02-03 | End: 2021-02-03 | Stop reason: HOSPADM

## 2021-02-03 RX ORDER — LEVETIRACETAM 500 MG/1
500 TABLET ORAL 2 TIMES DAILY
Qty: 60 TABLET | Refills: 11 | Status: SHIPPED | OUTPATIENT
Start: 2021-02-03 | End: 2022-02-03

## 2021-02-03 RX ORDER — SODIUM,POTASSIUM PHOSPHATES 280-250MG
2 POWDER IN PACKET (EA) ORAL
Status: DISCONTINUED | OUTPATIENT
Start: 2021-02-03 | End: 2021-02-03 | Stop reason: HOSPADM

## 2021-02-03 RX ADMIN — ESCITALOPRAM OXALATE 10 MG: 10 TABLET ORAL at 08:02

## 2021-02-03 RX ADMIN — POTASSIUM BICARBONATE 60 MEQ: 978 TABLET, EFFERVESCENT ORAL at 05:02

## 2021-02-03 RX ADMIN — MUPIROCIN: 20 OINTMENT TOPICAL at 08:02

## 2021-02-03 RX ADMIN — DOCUSATE SODIUM AND SENNOSIDES 1 TABLET: 8.6; 5 TABLET, FILM COATED ORAL at 08:02

## 2021-02-03 RX ADMIN — FOLIC ACID 1 MG: 1 TABLET ORAL at 08:02

## 2021-02-03 RX ADMIN — Medication 800 MG: at 05:02

## 2021-02-03 RX ADMIN — Medication 100 MG: at 08:02

## 2021-02-03 RX ADMIN — POTASSIUM BICARBONATE 60 MEQ: 978 TABLET, EFFERVESCENT ORAL at 10:02

## 2021-02-03 RX ADMIN — LEVETIRACETAM 500 MG: 5 INJECTION INTRAVENOUS at 08:02

## 2021-02-03 RX ADMIN — MEMANTINE HYDROCHLORIDE 5 MG: 5 TABLET ORAL at 08:02

## 2021-02-03 RX ADMIN — Medication 800 MG: at 10:02

## 2021-02-17 ENCOUNTER — TELEPHONE (OUTPATIENT)
Dept: NEUROSURGERY | Facility: CLINIC | Age: 51
End: 2021-02-17

## 2021-02-18 ENCOUNTER — TELEPHONE (OUTPATIENT)
Dept: NEUROSURGERY | Facility: CLINIC | Age: 51
End: 2021-02-18

## 2022-12-23 ENCOUNTER — HOSPITAL ENCOUNTER (EMERGENCY)
Facility: HOSPITAL | Age: 52
Discharge: HOME | End: 2022-12-23
Attending: EMERGENCY MEDICINE
Payer: MEDICARE

## 2022-12-23 VITALS
DIASTOLIC BLOOD PRESSURE: 82 MMHG | TEMPERATURE: 99 F | HEART RATE: 82 BPM | SYSTOLIC BLOOD PRESSURE: 128 MMHG | WEIGHT: 120 LBS | OXYGEN SATURATION: 100 % | RESPIRATION RATE: 14 BRPM | BODY MASS INDEX: 20.49 KG/M2 | HEIGHT: 64 IN

## 2022-12-23 DIAGNOSIS — S01.01XA LACERATION OF SCALP, INITIAL ENCOUNTER: Primary | ICD-10-CM

## 2022-12-23 DIAGNOSIS — F10.920 ALCOHOLIC INTOXICATION WITHOUT COMPLICATION: ICD-10-CM

## 2022-12-23 PROCEDURE — 99283 EMERGENCY DEPT VISIT LOW MDM: CPT | Mod: 25

## 2022-12-23 PROCEDURE — 12002 RPR S/N/AX/GEN/TRNK2.6-7.5CM: CPT

## 2022-12-23 NOTE — ED PROVIDER NOTES
Encounter Date: 12/23/2022       History     Chief Complaint   Patient presents with    Fall     Trip and fall after etoh abuse, lac to back of head, denies LOC from home       52-year-old male with a history of seizures as well as alcoholism presents emergency department complaining of laceration to his scalp sustained after an accidental trip and fall while drinking alcohol.  Patient denies assault.  He denies loss of consciousness.  States that he was drinking some beers at home when he fell and struck his head prompting his friend to call EMS.  He has no other complaints.    Review of patient's allergies indicates:  No Known Allergies  Past Medical History:   Diagnosis Date    Depression     Hemorrhage in the brain     Seizures     Stroke      Past Surgical History:   Procedure Laterality Date    BRAIN SURGERY      KNEE SURGERY       Family History   Problem Relation Age of Onset    Hypertension Mother     Stroke Mother     Stroke Father      Social History     Tobacco Use    Smoking status: Some Days   Substance Use Topics    Alcohol use: Yes     Comment: rarely    Drug use: No     Review of Systems   Constitutional:  Negative for activity change, appetite change, fatigue and fever.   HENT:  Negative for congestion, sore throat and voice change.    Respiratory:  Negative for cough, shortness of breath and wheezing.    Cardiovascular:  Negative for chest pain and leg swelling.   Gastrointestinal:  Negative for abdominal distention, diarrhea, nausea and vomiting.   Genitourinary:  Negative for dysuria, frequency and urgency.   Musculoskeletal:  Negative for arthralgias, back pain, myalgias, neck pain and neck stiffness.   Skin:  Positive for wound. Negative for pallor.   Neurological:  Negative for dizziness, tremors, seizures, syncope, weakness, light-headedness, numbness and headaches.   Hematological:  Negative for adenopathy.   Psychiatric/Behavioral:  Negative for agitation and confusion.      Physical Exam      Initial Vitals [12/23/22 1603]   BP Pulse Resp Temp SpO2   (!) 130/50 84 16 97.7 °F (36.5 °C) 100 %      MAP       --         Physical Exam    Nursing note and vitals reviewed.  Constitutional: He appears well-developed and well-nourished. He is not diaphoretic. No distress.   HENT:   Head: Normocephalic.   Right Ear: External ear normal.   Left Ear: External ear normal.   Nose: Nose normal.   Mouth/Throat: Oropharynx is clear and moist. No oropharyngeal exudate.   3 cm laceration to the left occiput with mild oozing.  No foreign body   Eyes: Conjunctivae and EOM are normal. Pupils are equal, round, and reactive to light. Right eye exhibits no discharge. Left eye exhibits no discharge. No scleral icterus.   Neck: Neck supple. No thyromegaly present. No tracheal deviation present.   Normal range of motion.  Cardiovascular:  Normal rate, regular rhythm, normal heart sounds and intact distal pulses.     Exam reveals no friction rub.       No murmur heard.  Pulmonary/Chest: Breath sounds normal. No stridor. No respiratory distress. He has no wheezes. He has no rhonchi. He has no rales.   Abdominal: Abdomen is soft. Bowel sounds are normal. He exhibits no distension and no mass. There is no abdominal tenderness. There is no rebound and no guarding.   Musculoskeletal:         General: No tenderness or edema. Normal range of motion.      Cervical back: Normal range of motion and neck supple.     Neurological: He is alert and oriented to person, place, and time. He has normal strength.   Smells of alcohol.  Ambulating around the emergency department with steady gait.   Skin: Skin is warm. Capillary refill takes less than 2 seconds. No rash noted. No erythema.   Psychiatric: He has a normal mood and affect. Thought content normal.       ED Course   Lac Repair    Date/Time: 12/23/2022 5:48 PM  Performed by: Mariana Vincent MD  Authorized by: Mariana Vincent MD     Consent:     Consent obtained:  Verbal    Consent given  by:  Patient    Risks discussed:  Pain, infection, retained foreign body and poor cosmetic result    Alternatives discussed:  No treatment  Universal protocol:     Patient identity confirmed:  Verbally with patient and hospital-assigned identification number  Anesthesia:     Anesthesia method:  None  Laceration details:     Location:  Scalp    Length (cm):  3  Pre-procedure details:     Preparation:  Patient was prepped and draped in usual sterile fashion  Treatment:     Amount of cleaning:  Standard    Irrigation solution:  Sterile saline    Irrigation method:  Syringe    Visualized foreign bodies/material removed: no      Debridement:  None    Undermining:  None    Scar revision: no    Skin repair:     Repair method:  Staples    Number of staples:  10  Approximation:     Approximation:  Close  Repair type:     Repair type:  Simple  Post-procedure details:     Dressing:  Open (no dressing)    Procedure completion:  Tolerated well, no immediate complications  Labs Reviewed - No data to display       Imaging Results    None          Medications - No data to display  Medical Decision Makin-year-old male with a history of alcohol use disorder presents emergency department status post accidental trip and fall complaining of a laceration to his scalp.  Denies loss of consciousness.  He is neurovascularly intact on exam.  He is laughing and joking with hospital staff.  He is ambulating around the emergency department without difficulty.  Laceration was irrigated with normal saline and repaired with 10 staples.  Patient tolerated without difficulty.  There is no indication for emergent imaging or further workup at this time.  He is counseled on wound care.  He will follow up with primary care.  He returns to the emergency department for any new or worsening complaints.                        Clinical Impression:   Final diagnoses:  [S01.01XA] Laceration of scalp, initial encounter (Primary)  [F10.920] Alcoholic  intoxication without complication        ED Disposition Condition    Discharge Stable          ED Prescriptions    None       Follow-up Information       Follow up With Specialties Details Why Contact Info    Matt Garcia MD Family Medicine Schedule an appointment as soon as possible for a visit in 1 week  [address] [facility] [address] [phone]               Mariana Vincent MD  12/23/22 7120

## 2022-12-23 NOTE — DISCHARGE INSTRUCTIONS
Wash wound daily with soap and water.  Pat dry.  Return for suture removal in approximately 7 days if you are unable to get in with your primary care doctor.  Drink alcohol in moderation.  Return to the emergency department for any new or worsening complaints.

## 2024-05-05 ENCOUNTER — HOSPITAL ENCOUNTER (EMERGENCY)
Facility: HOSPITAL | Age: 54
Discharge: HOME | End: 2024-05-05
Attending: STUDENT IN AN ORGANIZED HEALTH CARE EDUCATION/TRAINING PROGRAM
Payer: MEDICARE

## 2024-05-05 VITALS
WEIGHT: 120 LBS | BODY MASS INDEX: 20.6 KG/M2 | HEART RATE: 82 BPM | OXYGEN SATURATION: 98 % | RESPIRATION RATE: 18 BRPM | DIASTOLIC BLOOD PRESSURE: 79 MMHG | TEMPERATURE: 98 F | SYSTOLIC BLOOD PRESSURE: 124 MMHG

## 2024-05-05 DIAGNOSIS — F10.929 ALCOHOLIC INTOXICATION WITH COMPLICATION: Primary | ICD-10-CM

## 2024-05-05 LAB — POCT GLUCOSE: 86 MG/DL (ref 70–110)

## 2024-05-05 PROCEDURE — 99282 EMERGENCY DEPT VISIT SF MDM: CPT

## 2024-05-05 PROCEDURE — 82962 GLUCOSE BLOOD TEST: CPT

## 2024-05-05 NOTE — DISCHARGE INSTRUCTIONS

## 2024-05-05 NOTE — ED TRIAGE NOTES
Pt brought in by EMS after receiving a call that pt was laying on ground under a tree outside of a grocery store. Pt reports that he was sleeping under the tree at the store and someone called the police to check  on him. Reports drinking beer today. Pt appears intoxicated.

## 2024-05-05 NOTE — ED PROVIDER NOTES
Encounter Date: 5/5/2024       History     Chief Complaint   Patient presents with    Alcohol Intoxication     Brought in by Karolina GUZMAN due to being intoxicated outside of a grocery store. States he was sleeping.Denies fall.  Pt ambulatory with steady gait.Speech clear and coherent.No c/o voiced.Officer left before registering patient.      54-year-old male who is brought in by police for anticipate intoxication.  Per police, the patient was sleeping outside of a grocery store by a tree.  They brought him here for further evaluation.  Patient admits that he was drinking alcohol this afternoon, was sleeping by a tree.  He denies falls.  Or other trauma.  Patient has no complaints and does not want to be here.      Review of patient's allergies indicates:  No Known Allergies  Past Medical History:   Diagnosis Date    Depression     Hemorrhage in the brain     Seizures     Stroke      Past Surgical History:   Procedure Laterality Date    BRAIN SURGERY      KNEE SURGERY       Family History   Problem Relation Name Age of Onset    Hypertension Mother      Stroke Mother      Stroke Father       Social History     Tobacco Use    Smoking status: Some Days   Substance Use Topics    Alcohol use: Yes     Comment: daily    Drug use: No     Review of Systems    Physical Exam     Initial Vitals [05/05/24 1536]   BP Pulse Resp Temp SpO2   130/63 87 18 -- 99 %      MAP       --         Physical Exam    Nursing note and vitals reviewed.  Constitutional:   Conversational, coherent, clinically intoxicated   HENT:   Head: Normocephalic and atraumatic.   Eyes: EOM are normal. Pupils are equal, round, and reactive to light.   Neck: Neck supple.   Normal range of motion.  Cardiovascular:  Normal rate and regular rhythm.           Pulmonary/Chest: Breath sounds normal. No respiratory distress.   Abdominal: Abdomen is soft. There is no abdominal tenderness.   Musculoskeletal:         General: No tenderness or edema. Normal range of motion.       Cervical back: Normal range of motion and neck supple.     Neurological: He is alert. GCS score is 15. GCS eye subscore is 4. GCS verbal subscore is 5. GCS motor subscore is 6.   Alert oriented to person but not place, time or situation   Skin: Skin is warm and dry. Capillary refill takes less than 2 seconds.         ED Course   Procedures  Labs Reviewed   POCT GLUCOSE   POCT GLUCOSE MONITORING CONTINUOUS          Imaging Results    None          Medications - No data to display  Medical Decision Making  Hemodynamically stable. Afebrile. Phonating and protecting the airway spontaneously. No clinical evidence for cardiovascular instability or impending airway compromise. Examination as above. Additional historians include police. Prior medical records reviewed.  Patient with history of alcohol abuse per the chart. Current co-morbidities considered that will impact clinical decision making include as above.    Plan:   patient was conversational, coherent and clinically intoxicated.  He was witnessed to ambulate with a steady gait.  It appears the patient may have been brought here due to police picking him up with a store.  Due to the fact the patient can not tell me where he is and why, will continue to watch him for further sobriety.  Offered the patient food and nourishment.  No overt signs for trauma, patient without external signs, will hold off on imaging at this time.                 ED Course as of 05/05/24 1647   Sun May 05, 2024   1646 On reassessment, the patient is alert and oriented x4.  He ambulated to the bathroom on his own power.  Patient was clinically sober enough to get home.  Will discharge to his own recognizance [BG]      ED Course User Index  [BG] Aki Ambrocio MD                           Clinical Impression:  Final diagnoses:  [F10.929] Alcoholic intoxication with complication (Primary)          ED Disposition Condition    Discharge Stable          ED Prescriptions    None        Follow-up Information       Follow up With Specialties Details Why Contact Info    Matt Garcia MD Family Medicine Go to  As needed [address] [facility] [address] [phone]               Aki Ambrocio MD  05/05/24 8895